# Patient Record
Sex: FEMALE | Race: WHITE | NOT HISPANIC OR LATINO | Employment: UNEMPLOYED | URBAN - METROPOLITAN AREA
[De-identification: names, ages, dates, MRNs, and addresses within clinical notes are randomized per-mention and may not be internally consistent; named-entity substitution may affect disease eponyms.]

---

## 2023-01-10 DIAGNOSIS — O99.352 SEIZURE DISORDER DURING PREGNANCY IN SECOND TRIMESTER: Primary | ICD-10-CM

## 2023-01-10 DIAGNOSIS — G40.909 SEIZURE DISORDER DURING PREGNANCY IN SECOND TRIMESTER: Primary | ICD-10-CM

## 2023-01-23 ENCOUNTER — OFFICE VISIT (OUTPATIENT)
Dept: MATERNAL FETAL MEDICINE | Facility: CLINIC | Age: 23
End: 2023-01-23
Payer: MEDICAID

## 2023-01-23 VITALS
HEART RATE: 94 BPM | OXYGEN SATURATION: 98 % | RESPIRATION RATE: 20 BRPM | HEIGHT: 62 IN | WEIGHT: 157 LBS | SYSTOLIC BLOOD PRESSURE: 132 MMHG | DIASTOLIC BLOOD PRESSURE: 70 MMHG | BODY MASS INDEX: 28.89 KG/M2

## 2023-01-23 DIAGNOSIS — G40.909 SEIZURE DISORDER DURING PREGNANCY IN SECOND TRIMESTER: ICD-10-CM

## 2023-01-23 DIAGNOSIS — O99.352 SEIZURE DISORDER DURING PREGNANCY IN SECOND TRIMESTER: ICD-10-CM

## 2023-01-23 PROCEDURE — 1159F MED LIST DOCD IN RCRD: CPT | Mod: CPTII,S$GLB,, | Performed by: OBSTETRICS & GYNECOLOGY

## 2023-01-23 PROCEDURE — 3078F DIAST BP <80 MM HG: CPT | Mod: CPTII,S$GLB,, | Performed by: OBSTETRICS & GYNECOLOGY

## 2023-01-23 PROCEDURE — 3078F PR MOST RECENT DIASTOLIC BLOOD PRESSURE < 80 MM HG: ICD-10-PCS | Mod: CPTII,S$GLB,, | Performed by: OBSTETRICS & GYNECOLOGY

## 2023-01-23 PROCEDURE — 99203 OFFICE O/P NEW LOW 30 MIN: CPT | Mod: 25,TH,95,S$GLB | Performed by: OBSTETRICS & GYNECOLOGY

## 2023-01-23 PROCEDURE — 3075F PR MOST RECENT SYSTOLIC BLOOD PRESS GE 130-139MM HG: ICD-10-PCS | Mod: CPTII,S$GLB,, | Performed by: OBSTETRICS & GYNECOLOGY

## 2023-01-23 PROCEDURE — 3075F SYST BP GE 130 - 139MM HG: CPT | Mod: CPTII,S$GLB,, | Performed by: OBSTETRICS & GYNECOLOGY

## 2023-01-23 PROCEDURE — 1159F PR MEDICATION LIST DOCUMENTED IN MEDICAL RECORD: ICD-10-PCS | Mod: CPTII,S$GLB,, | Performed by: OBSTETRICS & GYNECOLOGY

## 2023-01-23 PROCEDURE — 3008F PR BODY MASS INDEX (BMI) DOCUMENTED: ICD-10-PCS | Mod: CPTII,S$GLB,, | Performed by: OBSTETRICS & GYNECOLOGY

## 2023-01-23 PROCEDURE — 99203 PR OFFICE/OUTPT VISIT, NEW, LEVL III, 30-44 MIN: ICD-10-PCS | Mod: 25,TH,95,S$GLB | Performed by: OBSTETRICS & GYNECOLOGY

## 2023-01-23 PROCEDURE — 76811 PR US, OB FETAL EVAL & EXAM, TRANSABDOM,FIRST GESTATION: ICD-10-PCS | Mod: 26,S$GLB,, | Performed by: OBSTETRICS & GYNECOLOGY

## 2023-01-23 PROCEDURE — 3008F BODY MASS INDEX DOCD: CPT | Mod: CPTII,S$GLB,, | Performed by: OBSTETRICS & GYNECOLOGY

## 2023-01-23 PROCEDURE — 76811 OB US DETAILED SNGL FETUS: CPT | Mod: 26,S$GLB,, | Performed by: OBSTETRICS & GYNECOLOGY

## 2023-01-23 RX ORDER — FOLIC ACID 1 MG/1
1 TABLET ORAL DAILY
Qty: 30 TABLET | Refills: 11 | Status: SHIPPED | OUTPATIENT
Start: 2023-01-23 | End: 2024-01-23

## 2023-01-23 RX ORDER — LACTULOSE 10 G/15ML
10 SOLUTION ORAL DAILY
Qty: 75 ML | Refills: 0 | Status: SHIPPED | OUTPATIENT
Start: 2023-01-23 | End: 2023-01-28

## 2023-01-23 RX ORDER — LEVETIRACETAM 1000 MG/1
TABLET ORAL
COMMUNITY
Start: 2023-01-04

## 2023-01-23 RX ORDER — ZONISAMIDE 100 MG/1
CAPSULE ORAL
COMMUNITY
Start: 2023-01-05

## 2023-01-23 RX ORDER — PROMETHAZINE HYDROCHLORIDE 12.5 MG/1
TABLET ORAL
COMMUNITY
Start: 2022-11-22

## 2023-01-23 NOTE — PROGRESS NOTES
"Rosey is here for initial M consultation, referred by Dr. Urias, Sr. for tonic/clonic Seizure Disorder with onset at age 2 years old.    She is feeling fetal flutter movement.    Rosey denies vaginal bleeding, loss of fluid, recurrent cramping, but does have occasional round ligament pains, and states that her nausea is getting better.    She states her last seizure was 4 months ago, was admitted due to 3 in one day.    She takes Keppra 2000 mg PO daily and 1000 mg PO QHS, and also Zonegran 400 mg PO daily.      Vitals:    01/23/23 1201   BP: 132/70   Pulse: 94   Resp: 20   SpO2: 98%   Weight: 71.2 kg (157 lb)   Height: 5' 2" (1.575 m)      BMI:                    28.72 kg/m^2               "

## 2023-01-23 NOTE — PROGRESS NOTES
Indication for consultation:  Intrauterine pregnancy at 17w1d  Longstanding seizure disorder requiring medication    Provider requesting consultation: Jeffrey Urias     Dear Dr. Urias,    Thank you very much for your referral of Rosey Salazar who was seen for initial perinatology consultation and sonography today.  As you recall she is a 22 y.o.  at 17w1d.  Unfortunately because of her longstanding history of epilepsy she is not the best historian.  She was very lucid in answering certain questions but obvious difficulties on recalling various events.  She currently lives with her fiance and his parents.  Her mother is  are father and brother live in [place].  She is by herself today.  She states that her last seizure was 4 months ago.  She also states she saw her neurologist a few months ago but that neurology is is leaving and she has not been able to find another.  Her history is primarily that of tonic-clonic seizures.  She reports being on Keppra and Zonegran for many years now.  She reports fetal movement and denies any vaginal bleeding, leakage of fluid, or cramping.    Her only complaint today is constipation.  She states lactulose has always worked for her.  She states she has been placed on stool softeners without improvement and that her seizure medicines cause constipation.    She verbally reported that plans are for a primary  delivery due to her endometriosis.  I asked her more questions about this as I found it atypical but she was not able to offer lot of details    PMH:  Grand mal seizure activity since age 2.  Last seen by neurologist 3 months ago.  No longer has neurology care.  Per her report last seizure 4 months ago.  Only side effects of current medications or constipation and slight sedation.  Patient is adamant that she does not have bipolar disorder but only ADHD    Ob Hx:  Primigravid    PSH:  No previous surgeries    Family hx:  Patient states her mother  of congestive  "heart failure.  She reports no known seizure history    SOC:  Patient lives with her fiance and his parents.  I get the impression it has a limited support system.  Her mother is  all her brother and father live in [place].  She denies any tobacco, drug, or alcohol use.    Medications:  Keppra 2000 mg in the morning and 1000 mg at bedtime and Zonegran 400 mg daily    Allergies:  Dilantin, Tegretol, Trileptal    Review of systems: The patient denies any vaginal bleeding, loss of fluid or contraction pain today.  Vitals:    23 1201   BP: 132/70   Pulse: 94   Resp: 20   SpO2: 98%   Weight: 71.2 kg (157 lb)   Height: 5' 2" (1.575 m)     Body mass index is 28.72 kg/m².    Physical exam:  Deferred as this was a telemedicine visit.    Ultrasound:  We demonstrated a Esparza gestation in cephalic presentation.  Heart rate 129 beats per minute.  The placenta is anterior and grade 1. Three-vessel cord was seen.  Amniotic fluid volume is normal.  BPD 18 weeks and 3 days, head circumference 18 weeks and 4 days, abdominal circumference 18 weeks and 0 days, and femur length 17 weeks and 1 day.  This corresponds to 206 g or 7 oz.  We visualized normal intracranial contents including lateral ventricles, cerebellum, posterior fossa, frontal horns, cavum septum pellucidum.  The fetal face and heart were completely suboptimal.  We saw normal stomach, kidneys, bladder cord insertion, spine and extremities.    Counseling:  If what the patient is seen is correct her seizure disorder is currently well controlled with no seizures for 4 months.  I told her we want her to remain seizure-free for the duration of the pregnancy.  She does not have a neurologist that can see her anymore nor other any local once that except Medicaid.  She is not able to drive to [facility] to see 1 here.  I told her we will have to make arrangements for her to see a neurologist if she does end up having another seizure and I explained that we will " check medication levels in the early 3rd trimester.  I told her I was happy to of to prescribe lactulose for constipation.  Encouraged her to be sure to take her anti seizure medications as prescribed.  I reviewed with her the birth defect risk trying to keep the conversation as simple as possible.  What we could see of the fetus was reassuring but details of the face and heart could not be seen as of yet    Assessment:  Intrauterine pregnancy at 17w1d  Longstanding seizure disorder requiring medication     Recommendations:   I would keep her on her current medication regimen as both of these medications can safely be used during pregnancy.  All seizure medications have the potential risk of causing birth defects but it is also extremely important that we keep the patient seizure-free.  No defects of the fetus were noted as of now but details of the face and heart could not be obtained as of yet.  Will follow her along with you and see her back here in 4-6 weeks  I am sending in a prescription for lactulose for her to use intermittently as she is having significant constipation.  It is important for her to be on folic acid daily for the duration of the pregnancy also so I have sent in a prescription for this too  The patient no longer has a neurologist and is not 1 in the area that will accept Medicaid.  She is not able to drive to [place] to see someone here.  Please plan on checking her medication levels of the Keppra and Zonegran sometime between 28 and 30 weeks.  If she ends up having a grand mal seizure between now and then will after range for either inpatient evaluation by Neurology arranging for some type of outpatient visit  The patient states that plans are for a primary  delivery because of her endometriosis.  This was confusing to me because I assumed you would not want to enter her abdomen that has significant endometriosis because of a higher risk of adhesions/scarring.  Just wanted you to  be aware that this is what the patient told me but as I described above she is confused about a number of things so wanted to be sure we are all on the same page.    Thanks once again for allowing us to participate in the care of your patients.  If you have any questions about today's consultation feel free to contact me or my partners at [phone].    Sincerely,    Leonard Sorenson Jr., M.D.  Maternal-Fetal Medicine       Today's visit was a face-to-face telemedicine video visit via ZOOM secure link between [facility] M[facility] office in [place], [place] and [facility] in [place], [place].    Total time personally involved in this patient's care: 40 minutes

## 2023-01-23 NOTE — LETTER
January 23, 2023        [facility] - Maternal Fetal Medicine  [address]  [place] [address]  Phone: [phone]  Fax: [phone]   Patient: Rosey Salazar   MR Number: [id number]   YOB: 2000   Date of Visit: 1/23/2023       Dear Dr. Urias:    Thank you for referring oRsey Salazar to me for evaluation. Attached you will find relevant portions of my assessment and plan of care.    If you have questions, please do not hesitate to call me. I look forward to following Rosey Salazar along with you.    Sincerely,      Leonard Sorenson MD          CC  Leonard Sorenson MD    Enclosure

## 2023-02-13 DIAGNOSIS — O99.352 SEIZURE DISORDER DURING PREGNANCY IN SECOND TRIMESTER: Primary | ICD-10-CM

## 2023-02-13 DIAGNOSIS — G40.909 SEIZURE DISORDER DURING PREGNANCY IN SECOND TRIMESTER: Primary | ICD-10-CM

## 2023-02-20 ENCOUNTER — PROCEDURE VISIT (OUTPATIENT)
Dept: MATERNAL FETAL MEDICINE | Facility: CLINIC | Age: 23
End: 2023-02-20
Payer: MEDICAID

## 2023-02-20 VITALS
HEART RATE: 92 BPM | RESPIRATION RATE: 18 BRPM | OXYGEN SATURATION: 99 % | WEIGHT: 159 LBS | DIASTOLIC BLOOD PRESSURE: 78 MMHG | BODY MASS INDEX: 29.08 KG/M2 | SYSTOLIC BLOOD PRESSURE: 116 MMHG

## 2023-02-20 DIAGNOSIS — O99.352 SEIZURE DISORDER DURING PREGNANCY IN SECOND TRIMESTER: ICD-10-CM

## 2023-02-20 DIAGNOSIS — G40.909 SEIZURE DISORDER DURING PREGNANCY IN SECOND TRIMESTER: ICD-10-CM

## 2023-02-20 PROCEDURE — 76816 PR  US,PREGNANT UTERUS,F/U,TRANSABD APP: ICD-10-PCS | Mod: S$GLB,,, | Performed by: OBSTETRICS & GYNECOLOGY

## 2023-02-20 PROCEDURE — 76816 OB US FOLLOW-UP PER FETUS: CPT | Mod: S$GLB,,, | Performed by: OBSTETRICS & GYNECOLOGY

## 2023-02-20 PROCEDURE — 99213 OFFICE O/P EST LOW 20 MIN: CPT | Mod: TH,S$GLB,, | Performed by: OBSTETRICS & GYNECOLOGY

## 2023-02-20 PROCEDURE — 99213 PR OFFICE/OUTPT VISIT, EST, LEVL III, 20-29 MIN: ICD-10-PCS | Mod: TH,S$GLB,, | Performed by: OBSTETRICS & GYNECOLOGY

## 2023-02-20 RX ORDER — LACTULOSE 10 G/15ML
SOLUTION ORAL
COMMUNITY
Start: 2023-01-23

## 2023-02-20 NOTE — PROGRESS NOTES
Rosey is here for followup [facility] consultation for seizure disorder, referred by Dr. Urias, Sr.     She is feeling fetal movement.    Rosey denies vaginal bleeding, loss of fluid, recurrent cramping.    She states that she had one seizure 2 weeks ago and her medications remain unchanged; she takes Keppra 2000 mg PO QAM and 1000 mg PO QPM, and Zonegran 400 mg PO daily.    She also takes folic acid and lactulose.    Vitals:    02/20/23 1251   BP: 116/78   Pulse: 92   Resp: 18   SpO2: 99%   Weight: 72.1 kg (159 lb)     BMI:                    29.08 kg/m^2

## 2023-02-20 NOTE — LETTER
February 20, 2023        [facility] - Maternal Fetal Medicine  [address]  [place] [address]  Phone: [phone]  Fax: [phone]   Patient: Rosey Salazar   MR Number: [id number]   YOB: 2000   Date of Visit: 2/20/2023       Dear Dr. Urias:    Thank you for referring Rosey Salazar to me for evaluation. Below are the relevant portions of my assessment and plan of care.            If you have questions, please do not hesitate to call me. I look forward to following Rosey along with you.    Sincerely,      Dewayne Valle MD           CC  No Recipients

## 2023-02-20 NOTE — PROGRESS NOTES
Indication for follow up consultation:  Seizure disorder in pregnancy    Provider requesting consultation: Dr. Urias, Sr    Dear Dr. Urias    I had the pleasure of seeing Rosey Salazar for follow up consultation today.  As you recall she is a 22 y.o.  at 21w1d here for follow up consultation regarding seizure disorder in pregnancy.      She states that she had one seizure 2 weeks ago and her medications remain unchanged; she takes Keppra 2000 mg PO QAM and 1000 mg PO QPM, and Zonegran 400 mg PO daily. Per patient report she is about to run out of medications and her neurologist is no longer practicing. She can't find a local neurologist.    Review of systems: The patient denies any vaginal bleeding, loss of fluid or contraction pain today.  Vitals:    23 1251   BP: 116/78   Pulse: 92   Resp: 18       Physical exam:  Gen: WDWN in NAD  HEENT: WNL  Abdomen: Soft, non-tender on ultrasound exam  Skin: No rash or jaundice  Extremities: No clubbing or cyanosis  Neuro: Grossly intact    Ultrasound:  A repeat detailed fetal anatomical survey was completed once again today.  There is a single intrauterine pregnancy in the breech presentation.  The estimated fetal weight is 482 grams which is the 56th percentile for this gestational age. The fetus did not show any overt evidence of structure abnormalities.  The amniotic fluid volume appears to be normal. The placenta does not show any evidence of previa.  Please see our official report for further specifics.    Recommendations: Today I reviewed with the patient the above ultrasound results.  I also refilled her seizure medication as her neurologist is no longer practicing. I have made plans for her to return here in 4 weeks to reassess fetal growth and well being.      Thanks once again for allowing us to participate in the care of your patients.  If you have any questions about today's consultation feel free to contact me or my partners at  [phone].    Sincerely,

## 2023-03-14 DIAGNOSIS — O99.352 SEIZURE DISORDER DURING PREGNANCY IN SECOND TRIMESTER: Primary | ICD-10-CM

## 2023-03-14 DIAGNOSIS — G40.909 SEIZURE DISORDER DURING PREGNANCY IN SECOND TRIMESTER: Primary | ICD-10-CM

## 2023-03-20 ENCOUNTER — PROCEDURE VISIT (OUTPATIENT)
Dept: MATERNAL FETAL MEDICINE | Facility: CLINIC | Age: 23
End: 2023-03-20
Payer: MEDICAID

## 2023-03-20 VITALS
RESPIRATION RATE: 18 BRPM | HEART RATE: 74 BPM | DIASTOLIC BLOOD PRESSURE: 76 MMHG | OXYGEN SATURATION: 95 % | SYSTOLIC BLOOD PRESSURE: 112 MMHG | WEIGHT: 160 LBS | BODY MASS INDEX: 29.26 KG/M2

## 2023-03-20 DIAGNOSIS — O99.352 SEIZURE DISORDER DURING PREGNANCY IN SECOND TRIMESTER: ICD-10-CM

## 2023-03-20 DIAGNOSIS — G40.909 SEIZURE DISORDER DURING PREGNANCY IN SECOND TRIMESTER: ICD-10-CM

## 2023-03-20 PROCEDURE — 99213 PR OFFICE/OUTPT VISIT, EST, LEVL III, 20-29 MIN: ICD-10-PCS | Mod: TH,S$GLB,, | Performed by: OBSTETRICS & GYNECOLOGY

## 2023-03-20 PROCEDURE — 99213 OFFICE O/P EST LOW 20 MIN: CPT | Mod: TH,S$GLB,, | Performed by: OBSTETRICS & GYNECOLOGY

## 2023-03-20 NOTE — PROGRESS NOTES
Follow-up MFM Consultation  Referring provider: Dr. Bridgtet Milton    Indications for referral:  1) Pregnancy at 25 weeks and 1 days  ([facility] 7-2-2023)  2) Seizure Disorder on 2 meds    Dear Dr. Bridgett Milton,  Thank you for your kind request for consultation and imaging of your patient at the [facility] at [facility].  Since her last visit here, she has had a seizure and was seen in the hospital for it.  Her medications were not changed. She continues to take Zonegran and Keppra. She has no complaints today.     Physical Exam  Vital signs: 112/76, 74, 18  General: Age appearing female in no apparent distress.  ABDOMEN:  Gravid, soft, nontender  Uterus: Nontender, appropriate height for gestational age    ULTRASOUND FINDINGS:  A repeat ultrasound was performed. The fetus is cephalic. EFW of 868grams is at the 60th percentile.  The placenta is anterior.  Amniotic fluid is normal. There are no fetal structural malformations to extent of view.    IMPRESSION:     1) 25 week gestation  2) Seizure Disorder on 2 medications  3) Reassuring fetal growth and wellbeing    RECOMMENDATIONS/DISCUSSION:  1) We discussed the reassuring sonographic findings.  We will reevaluate sonographically in 4 weeks.  2) The patient informs me that she you are working on getting her in to see a neurologist.  If you cannot find one locally, we may be able to coordinate getting her in with one in [place].  She should continue to go to the hospital if she has any further seizures.  3) No modifications to obstetric care are warranted at this time.    Thank you for allowing us to participate in her care.  Please do not hesitate to call with questions. This encounter was via the benefit of telemedicine due to COVID-19 restrictions.  The patient agreed to telemedicine.  Dr. Sun was located at [facility], and the patient was located at [facility] in [place].  Total face-to-face time via  electronically secure Zoom/ipad was 5 minutes.   -Diamond Sun MD

## 2023-03-20 NOTE — PROGRESS NOTES
Rosey is here for followup [facility] consultation for seizure disorder, referred by Dr. Bridgett Jr; changed from Bridgett Sr.    She is feeling fetal movement.    Rosey denies vaginal bleeding, loss of fluid, recurrent contractions.    She and her SO state that she has not had any seizures since early February.  She takes Keppra 2000 mg PO Q AM and 1000 mg PO Q PM; also Zonegran 400 mg PO daily.    Vitals:    03/20/23 0853   BP: 112/76   Pulse: 74   Resp: 18   SpO2: 95%   Weight: 72.6 kg (160 lb)     BMI:                    29.26 kg/m^2

## 2023-03-20 NOTE — LETTER
March 20, 2023        John Urias MD  [address] [facility] [address]             [facility] - Maternal Fetal Medicine  [address]  [place] [address]  Phone: [phone]  Fax: [phone]   Patient: Rosey Salazar   MR Number: [id number]   YOB: 2000   Date of Visit: 3/20/2023       Dear Dr. Urias:    Thank you for referring Rosey Salazar to me for evaluation. Below are the relevant portions of my assessment and plan of care.            If you have questions, please do not hesitate to call me. I look forward to following Rosey along with you.    Sincerely,      Diamond Sun MD           CC  No Recipients

## 2023-04-11 DIAGNOSIS — G40.909 SEIZURE DISORDER DURING PREGNANCY IN SECOND TRIMESTER: Primary | ICD-10-CM

## 2023-04-11 DIAGNOSIS — O99.352 SEIZURE DISORDER DURING PREGNANCY IN SECOND TRIMESTER: Primary | ICD-10-CM

## 2023-04-24 ENCOUNTER — PROCEDURE VISIT (OUTPATIENT)
Dept: MATERNAL FETAL MEDICINE | Facility: CLINIC | Age: 23
End: 2023-04-24
Payer: MEDICAID

## 2023-04-24 VITALS
HEART RATE: 87 BPM | RESPIRATION RATE: 20 BRPM | OXYGEN SATURATION: 99 % | BODY MASS INDEX: 32.19 KG/M2 | SYSTOLIC BLOOD PRESSURE: 124 MMHG | WEIGHT: 176 LBS | DIASTOLIC BLOOD PRESSURE: 84 MMHG

## 2023-04-24 DIAGNOSIS — G40.909 SEIZURE DISORDER DURING PREGNANCY IN SECOND TRIMESTER: ICD-10-CM

## 2023-04-24 DIAGNOSIS — O99.352 SEIZURE DISORDER DURING PREGNANCY IN SECOND TRIMESTER: ICD-10-CM

## 2023-04-24 PROCEDURE — 99213 OFFICE O/P EST LOW 20 MIN: CPT | Mod: TH,S$GLB,, | Performed by: OBSTETRICS & GYNECOLOGY

## 2023-04-24 PROCEDURE — 76816 OB US FOLLOW-UP PER FETUS: CPT | Mod: S$GLB,,, | Performed by: OBSTETRICS & GYNECOLOGY

## 2023-04-24 PROCEDURE — 76816 PR  US,PREGNANT UTERUS,F/U,TRANSABD APP: ICD-10-PCS | Mod: S$GLB,,, | Performed by: OBSTETRICS & GYNECOLOGY

## 2023-04-24 PROCEDURE — 99213 PR OFFICE/OUTPT VISIT, EST, LEVL III, 20-29 MIN: ICD-10-PCS | Mod: TH,S$GLB,, | Performed by: OBSTETRICS & GYNECOLOGY

## 2023-04-24 NOTE — LETTER
April 24, 2023        John Urias MD  [address] [facility] [address]             [place] - Maternal Fetal Medicine  [address]  [place] [address]  Phone: [phone]  Fax: [phone]   Patient: Rosey Salazar   MR Number: [id number]   YOB: 2000   Date of Visit: 4/24/2023       Dear Dr. Urias:    Thank you for referring Rosey Salazar to me for evaluation. Below are the relevant portions of my assessment and plan of care.            If you have questions, please do not hesitate to call me. I look forward to following Rosey along with you.    Sincerely,      Woody Garrido MD           CC  No Recipients

## 2023-04-24 NOTE — PROGRESS NOTES
Follow-up [facility] consultation note:  Woody Anna MD    Reason for consultation:     1. Pregnancy at 30 weeks' gestation  2. History of seizure disorder; on both Keppra and Zonegran      Dear Dr. Urias,    Today, I had the opportunity to see your patient in follow-up at the [facility].  I greatly appreciate your request for follow-up imaging and consultation.  Your patient is a 22-year-old primigravida at 25 weeks and 1 day gestation.  The patient has a seizure disorder.  She takes Keppra 2000 mg in the morning and 1000 mg at bedtime.  She is on Zonegran 400 mg daily.    Obstetric, your patient is doing well.  She denies leakage of fluid, spotting, and contractions.  Overall she feels well today.    Physical examination    General:  She is a well-developed well-nourished female in no apparent distress.  Vital signs:  Blood pressure 124/84, pulse 87, respirations 20, pulse oximetry 99%  HEENT:  Grossly within normal limits.  There is no facial edema.  The sclera appears normal.  Abdomen:  Benign exam.  The uterus is nontender to palpation.  The uterus is appropriate size.  Extremities:  No ankle edema is palpable.  Skin:  There is no rash or lesions.  Neuro:  Grossly intact  Psych:  The patient is appropriate for both mood and affect.      Imaging:  [facility] imaging was repeated today.  A full ultrasound report has been created in the [employer] system and a copy will be placed in the EMR and will also be forwarded to you separately.  In summary, we find a baby that is normally grown at the 54th percentile.  The fluid volume is normal.  No anatomic abnormalities noted.    Impression:  Reassuring assessment of both mother and fetus today.  The patient has not had a seizure for 34 months.  This is reassuring.  The fetal assessment is reassuring with normal growth, fluid, and placenta.  As the clinical situation is so stable I do not think the patient requires a follow-up visit here in the [facility]  Center.    Recommendations:    1. Your patient was encouraged to continue to take her medications as prescribed daily.  She was informed that it is very important not to have a seizure during pregnancy.    2. No follow-up in the [facility] is advised to schedule.  Please reconsult as indicated.    Please feel free to call me if you have any questions about the care of your patient.  The [facility] group can be reached 24 hours a day at [phone].  I greatly appreciate the opportunity to participate in the care of your patient.    Sincerely, Woody Anna MD

## 2023-04-24 NOTE — PROGRESS NOTES
Rosey is here for followup [facility] consultation for seizure disorder, referred by Dr. Bridgett Jr.    She is feeling fetal movement.    Rosey denies vaginal bleeding, loss of fluid, recurrent contractions.    She states she has not had any seizures since early February.    She takes Keppra 2000 mg PO Q AM and 1000 mg PO Q PM, Zonegran 400 mg PO daily.    Vitals:    04/24/23 0934   BP: 124/84   Pulse: 87   Resp: 20   SpO2: 99%   Weight: 79.8 kg (176 lb)     BMI:                    32.19 kg/m^2